# Patient Record
Sex: FEMALE | Race: WHITE
[De-identification: names, ages, dates, MRNs, and addresses within clinical notes are randomized per-mention and may not be internally consistent; named-entity substitution may affect disease eponyms.]

---

## 2017-04-17 ENCOUNTER — HOSPITAL ENCOUNTER (EMERGENCY)
Dept: HOSPITAL 74 - FER | Age: 42
Discharge: HOME | End: 2017-04-17
Payer: COMMERCIAL

## 2017-04-17 VITALS — SYSTOLIC BLOOD PRESSURE: 138 MMHG | TEMPERATURE: 98 F | DIASTOLIC BLOOD PRESSURE: 91 MMHG

## 2017-04-17 VITALS — BODY MASS INDEX: 29.9 KG/M2

## 2017-04-17 VITALS — HEART RATE: 98 BPM

## 2017-04-17 DIAGNOSIS — B97.89: ICD-10-CM

## 2017-04-17 DIAGNOSIS — J06.9: Primary | ICD-10-CM

## 2017-04-17 DIAGNOSIS — E07.9: ICD-10-CM

## 2017-04-17 DIAGNOSIS — F41.9: ICD-10-CM

## 2017-04-17 DIAGNOSIS — I10: ICD-10-CM

## 2017-04-17 DIAGNOSIS — R00.2: ICD-10-CM

## 2017-04-17 NOTE — PDOC
History of Present Illness





<Chantale Marcano - Last Filed: 04/17/17 21:20>





- General


History Source: Patient


Exam Limitations: No Limitations





- History of Present Illness


Initial Comments: 


04/17/17 21:15





A portion of this note was documented by scribe services under my direction. I 

have reviewed the details of the note, within reason, and agree with the 

documentation.  The case summary and management plan written by me. 








Assessment and plan: This is a 41-year-old female who has history of anxiety 

and palpitations in the past. Patient came in complaining of some upper 

respiratory tract symptoms and the palpitations. Patient had a heart rate 

initially of 113 in the emergency room but by the time she had a cardiogram 

done it was down to 106 the time of my evaluation it was done around 100.





Patient otherwise has a history of Hypertension and obesity but is otherwise 

healthy.  Patient cardiogram showed normal sinus rhythm at a rate of 106 no 

acute ST-T wave changes otherwise normal EKG





Patient given some Xanax for anxiety as she said she does feel anxious. Heart 

rate is 98 at discharge.





Patient discharged home will follow-up with her primary care doctor





04/17/17 21:27








<David Shepard - Last Filed: 04/17/17 21:28>





- General


Chief Complaint: Tachycardia


Stated Complaint: HEART RACING


Time Seen by Provider: 04/17/17 20:57





Past History





<Chantale Marcano - Last Filed: 04/17/17 21:20>





- Past Medical History


HTN: Yes


Thyroid Disease: Yes


Other medical history: ANXIETY





- Immunization History


Immunization Up to Date: Yes





- Psycho/Social/Smoking Cessation Hx


Anxiety: No


Suicidal Ideation: No


Smoking Status: No


Smoking History: Never smoked


Have you smoked in the past 12 months: No


Number of Cigarettes Smoked Daily: 0


Information on smoking cessation initiated: No


Hx Alcohol Use: No


Drug/Substance Use Hx: No


Substance Use Type: None





<David Shepard - Last Filed: 04/17/17 21:28>





- Past Medical History


Allergies/Adverse Reactions: 


 Allergies











Allergy/AdvReac Type Severity Reaction Status Date / Time


 


Penicillins Allergy Mild Hives Verified 07/27/16 18:52











Home Medications: 


Ambulatory Orders





Levothyroxine Sodium [Synthroid] 175 mcg PO DAILY 01/27/12 


Nebivolol [Bystolic -] 5 mg PO DAILY 05/24/16 


Escitalopram Oxalate [Lexapro -] 20 mg PO DAILY 04/17/17 











*Physical Exam





- Vital Signs


 Last Vital Signs











Temp Pulse Resp BP Pulse Ox


 


 98 F   113 H  16   138/91   98 


 


 04/17/17 20:52  04/17/17 20:52  04/17/17 20:52  04/17/17 20:52  04/17/17 20:52














<Chantale Marcano - Last Filed: 04/17/17 21:20>





- Vital Signs


 Last Vital Signs











Temp Pulse Resp BP Pulse Ox


 


 98 F   113 H  16   138/91   98 


 


 04/17/17 20:52  04/17/17 20:52  04/17/17 20:52  04/17/17 20:52  04/17/17 20:52














<David Shepard - Last Filed: 04/17/17 21:28>





**Heart Score/ECG Review


  ** #1


ECG reviewed & interpreted by me at: 21:03 (Vent Rate: 106 bpm. Sinus 

tachycardia. )





<Chantale Marcano - Last Filed: 04/17/17 21:20>





*DC/Admit/Observation/Transfer





- Attestations


Scribe Attestion: 


04/17/17 21:20





Documentation prepared by Chantale Marcano, acting as medical scribe for 

David Shepard MD. 





<Chantale Marcano - Last Filed: 04/17/17 21:20>





- Discharge Dispostion


Admit: No





<David Shepard - Last Filed: 04/17/17 21:28>


Diagnosis at time of Disposition: 


 Heart palpitations, Anxiety, Viral upper respiratory infection





- Discharge Dispostion


Disposition: HOME


Condition at time of disposition: Stable





- Patient Instructions


Additional Instructions: 


You can continue to take your medications as prescribed.





Return to the emergency department immediately with ANY new, persistent or 

worsening symptoms.





Continue any medications as previously prescribed by your physician.





You should follow up with your primary doctor as soon as possible regarding 

today's emergency department visit.


.


Please make sure your doctor reviews the results of your emergency evaluation.





Thank you for coming to the   Emergency Department today for your care. It was 

a pleasure to see you today. Please note that your evaluation is INCOMPLETE 

until you  follow-up with your doctor.

## 2017-04-17 NOTE — PDOC
*Physical Exam





- Vital Signs


 Last Vital Signs











Temp Pulse Resp BP Pulse Ox


 


 98 F   98 H  16   138/91   98 


 


 04/17/17 20:52  04/17/17 21:28  04/17/17 20:52  04/17/17 20:52  04/17/17 20:52














<David Shepard I - Last Filed: 04/17/17 21:29>





- Vital Signs


 Last Vital Signs











Temp Pulse Resp BP Pulse Ox


 


 98 F   98 H  16   138/91   98 


 


 04/17/17 20:52  04/17/17 21:28  04/17/17 20:52  04/17/17 20:52  04/17/17 20:52














<Chantale Marcano - Last Filed: 04/17/17 21:30>





ED Treatment Course





- Medications


Given in the ED: 


ED Medications














Discontinued Medications














Generic Name Dose Route Start Last Admin





  Trade Name Freq  PRN Reason Stop Dose Admin


 


Alprazolam  0.5 mg 04/17/17 21:18 04/17/17 21:25





  Xanax -  PO 04/17/17 21:19  0.5 mg





  ONCE STA   Administration














<David Shepard I - Last Filed: 04/17/17 21:29>





- Medications


Given in the ED: 


ED Medications














Discontinued Medications














Generic Name Dose Route Start Last Admin





  Trade Name Freq  PRN Reason Stop Dose Admin


 


Alprazolam  0.5 mg 04/17/17 21:18 04/17/17 21:25





  Xanax -  PO 04/17/17 21:19  0.5 mg





  ONCE STA   Administration














<Chantale Marcano - Last Filed: 04/17/17 21:30>





Progress Note





- Progress Note


Progress Note: 


[]This chart is to document the scribes portion of the chart


Which is the history of present illness the review of systems and the exam. The 

other chart was closed prior to scribes documentation.





<David Shepard - Last Filed: 04/17/17 21:29>





- Progress Note


Progress Note: 


The patient is a 41 year old female, with a significant past medical history of 

hypertension, hypothyroidism and anxiety, who presents to the emergency 

department with palpitations since earlier this evening. The patient states 

that she has experienced similar episodes of palpitations in the past. The 

patient additionally reports that she believes she is coming down with a cold, 

she reports URI symptoms including nasal congestion and a cough so she decided 

to visit the ED for further evaluation. Currently in the ED, the patient 

reports feeling anxious. The patient denies chest pain or shortness of breath. 

The patient denies fever, chills, nausea, vomiting or diarrhea. 





PAST MEDICAL HISTORY: See HPI.





PAST SURGICAL HISTORY: No significant history.





FAMILY HISTORY: No pertinent history.





SOCIAL HISTORY: Patient lives with family and is employed.





MEDICATIONS: Reviewed.





ALLERGIES: As per nursing notes.





Adult ROS:


General: No fevers or chills, no weakness, no weight loss.


HEENT: +Nasal congestion. No change in vision. No sore throat. No ear pain.


Cardiovascular: +Palpitations. No chest pain or shortness of breath.


Respiratory: +Cough. No wheezing. 


Gastrointestinal: No nausea, vomiting, diarrhea or constipation, no rectal 

bleeding.


Genitourinary: No dysuria, hematuria, or frequency.


Musculoskeletal: No joint or muscle pain or swelling.


Neurologic: No headache, vertigo, dizziness or loss of consciousness.


Psychiatric: +Anxiety. No depression.


Skin: No rashes or easy bruising.


Endocrine: No increased thirst or abnormal weight change.


Allergic: No skin or latex allergy.


All other systems reviewed and normal.





Adult Physical Exam:


General: Well-nourished well-developed individual, no acute distress.


HEENT: Nasal congestion. Throat: Normal, tonsils normal, no erythema or exudate.


Neck: Supple, no meningeal signs, no lymphadenopathy.


Eyes: Pupils equal reactive and round, extraocular motion intact.


Chest: Nontender to palpation. 


Cardiac: Mild tachycardia. S1-S2 normal, no murmurs rubs or gallops.


Respiratory: Lungs clear to auscultation bilateral.


Abdomen: Soft, nondistended, normal bowel sounds, nontender to palpation 

diffusely.


Extremities: Warm, dry, no cyanosis, clubbing, or edema.


Skin: No rashes.


Neuro: Alert and oriented x3, nonfocal exam, grossly intact, normal gait.


Psych: Normal mood and affect. 





<Chantale Marcano - Last Filed: 04/17/17 21:30>





*DC/Admit/Observation/Transfer





<David Shepard - Last Filed: 04/17/17 21:29>





- Attestations


Scribe Attestion: 


04/17/17 21:30





Documentation prepared by Chantale Marcano, acting as medical scribe for 

David Shepard MD. 





<Chantale Marcano - Last Filed: 04/17/17 21:30>


Diagnosis at time of Disposition: 


 Heart palpitations, Anxiety, Viral upper respiratory infection





- Discharge Dispostion


Disposition: HOME


Condition at time of disposition: Stable





- Patient Instructions


Additional Instructions: 


You can continue to take your medications as prescribed.





Return to the emergency department immediately with ANY new, persistent or 

worsening symptoms.





Continue any medications as previously prescribed by your physician.





You should follow up with your primary doctor as soon as possible regarding 

today's emergency department visit.


.


Please make sure your doctor reviews the results of your emergency evaluation.





Thank you for coming to the   Emergency Department today for your care. It was 

a pleasure to see you today. Please note that your evaluation is INCOMPLETE 

until you  follow-up with your doctor.

## 2017-04-18 NOTE — EKG
Test Reason : 

Blood Pressure : ***/*** mmHG

Vent. Rate : 106 BPM     Atrial Rate : 106 BPM

   P-R Int : 138 ms          QRS Dur : 082 ms

    QT Int : 356 ms       P-R-T Axes : 041 047 002 degrees

   QTc Int : 472 ms

 

SINUS TACHYCARDIA

OTHERWISE NORMAL ECG

NO PREVIOUS ECGS AVAILABLE

Confirmed by KEVIN NI, ERICH ([id number]) on 4/18/2017 1:16:05 PM

 

Referred By:  POLY           Confirmed By:ERICH BROWN MD

## 2017-09-25 ENCOUNTER — HOSPITAL ENCOUNTER (EMERGENCY)
Dept: HOSPITAL 74 - FER | Age: 42
Discharge: HOME | End: 2017-09-25
Payer: COMMERCIAL

## 2017-09-25 VITALS — BODY MASS INDEX: 51.2 KG/M2

## 2017-09-25 VITALS — HEART RATE: 82 BPM | DIASTOLIC BLOOD PRESSURE: 75 MMHG | SYSTOLIC BLOOD PRESSURE: 113 MMHG

## 2017-09-25 VITALS — TEMPERATURE: 98.6 F

## 2017-09-25 DIAGNOSIS — I10: ICD-10-CM

## 2017-09-25 DIAGNOSIS — R07.89: Primary | ICD-10-CM

## 2017-09-25 DIAGNOSIS — F41.9: ICD-10-CM

## 2017-09-25 DIAGNOSIS — E28.2: ICD-10-CM

## 2017-09-25 LAB
ALBUMIN SERPL-MCNC: 4.4 G/DL (ref 3.5–5)
ALP SERPL-CCNC: 46 U/L (ref 32–92)
ALT SERPL-CCNC: 24 U/L (ref 10–40)
ANION GAP SERPL CALC-SCNC: 10 MMOL/L (ref 8–16)
AST SERPL-CCNC: 23 U/L (ref 10–42)
BASOPHILS # BLD: 2 % (ref 0–2)
BILIRUB SERPL-MCNC: 0.8 MG/DL (ref 0.2–1)
CALCIUM SERPL-MCNC: 9.3 MG/DL (ref 8.4–10.2)
CK SERPL-CCNC: 178 IU/L (ref 26–192)
CO2 SERPL-SCNC: 23 MMOL/L (ref 22–28)
CREAT SERPL-MCNC: 0.7 MG/DL (ref 0.6–1.3)
DEPRECATED RDW RBC AUTO: 11.2 % (ref 11.6–15.6)
EOSINOPHIL # BLD: 2.4 % (ref 0–4.5)
GLUCOSE SERPL-MCNC: 90 MG/DL (ref 74–106)
MCH RBC QN AUTO: 30.4 PG (ref 25.7–33.7)
MCHC RBC AUTO-ENTMCNC: 34.5 G/DL (ref 32–36)
MCV RBC: 88 FL (ref 80–96)
NEUTROPHILS # BLD: 50.1 % (ref 42.8–82.8)
PLATELET # BLD AUTO: 332 K/MM3 (ref 134–434)
PMV BLD: 7.2 FL (ref 7.5–11.1)
PROT SERPL-MCNC: 7.6 G/DL (ref 6.4–8.3)
TROPONIN I SERPL-MCNC: < 0.03 NG/ML (ref 0.03–0.5)
WBC # BLD AUTO: 7.3 K/MM3 (ref 4–10.8)
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
## 2017-09-26 NOTE — EKG
Test Reason : 

Blood Pressure : ***/*** mmHG

Vent. Rate : 092 BPM     Atrial Rate : 092 BPM

   P-R Int : 144 ms          QRS Dur : 082 ms

    QT Int : 374 ms       P-R-T Axes : 075 014 006 degrees

   QTc Int : 462 ms

 

NORMAL SINUS RHYTHM

MODERATE VOLTAGE CRITERIA FOR LVH, MAY BE NORMAL VARIANT

BORDERLINE ECG

WHEN COMPARED WITH ECG OF 17-APR-2017 21:03,

NO SIGNIFICANT CHANGE WAS FOUND

REPEAT EKG IF CLINICALLY INDICATED

BASELINE ARTIFACT

Confirmed by WILMA FARMER MD ([id number]) on 9/26/2017 8:37:24 PM

 

Referred By:  AMANDA           Confirmed By:WILMA FARMER MD

## 2019-03-14 ENCOUNTER — HOSPITAL ENCOUNTER (EMERGENCY)
Dept: HOSPITAL 74 - JER | Age: 44
Discharge: HOME | End: 2019-03-14
Payer: COMMERCIAL

## 2019-03-14 VITALS — HEART RATE: 104 BPM | TEMPERATURE: 98.4 F | DIASTOLIC BLOOD PRESSURE: 89 MMHG | SYSTOLIC BLOOD PRESSURE: 162 MMHG

## 2019-03-14 VITALS — BODY MASS INDEX: 47.5 KG/M2

## 2019-03-14 DIAGNOSIS — K21.9: ICD-10-CM

## 2019-03-14 DIAGNOSIS — R07.89: Primary | ICD-10-CM

## 2019-03-14 DIAGNOSIS — E03.9: ICD-10-CM

## 2019-03-14 DIAGNOSIS — F41.9: ICD-10-CM

## 2019-03-14 LAB
ALBUMIN SERPL-MCNC: 3.7 G/DL (ref 3.4–5)
ALP SERPL-CCNC: 53 U/L (ref 45–117)
ALT SERPL-CCNC: 22 U/L (ref 13–61)
ANION GAP SERPL CALC-SCNC: 5 MMOL/L (ref 8–16)
AST SERPL-CCNC: 26 U/L (ref 15–37)
BASOPHILS # BLD: 0.3 % (ref 0–2)
BILIRUB SERPL-MCNC: 0.4 MG/DL (ref 0.2–1)
BUN SERPL-MCNC: 9 MG/DL (ref 7–18)
CALCIUM SERPL-MCNC: 9.1 MG/DL (ref 8.5–10.1)
CHLORIDE SERPL-SCNC: 107 MMOL/L (ref 98–107)
CO2 SERPL-SCNC: 26 MMOL/L (ref 21–32)
CREAT SERPL-MCNC: 0.6 MG/DL (ref 0.55–1.3)
DEPRECATED RDW RBC AUTO: 12.8 % (ref 11.6–15.6)
EOSINOPHIL # BLD: 2.3 % (ref 0–4.5)
GLUCOSE SERPL-MCNC: 104 MG/DL (ref 74–106)
HCT VFR BLD CALC: 37.4 % (ref 32.4–45.2)
HGB BLD-MCNC: 13.1 GM/DL (ref 10.7–15.3)
INR BLD: 0.97 (ref 0.83–1.09)
LYMPHOCYTES # BLD: 35.7 % (ref 8–40)
MCH RBC QN AUTO: 31.4 PG (ref 25.7–33.7)
MCHC RBC AUTO-ENTMCNC: 34.9 G/DL (ref 32–36)
MCV RBC: 89.9 FL (ref 80–96)
MONOCYTES # BLD AUTO: 8.5 % (ref 3.8–10.2)
NEUTROPHILS # BLD: 53.2 % (ref 42.8–82.8)
PLATELET # BLD AUTO: 325 K/MM3 (ref 134–434)
PMV BLD: 7.6 FL (ref 7.5–11.1)
POTASSIUM SERPLBLD-SCNC: 4.2 MMOL/L (ref 3.5–5.1)
PROT SERPL-MCNC: 7.3 G/DL (ref 6.4–8.2)
PT PNL PPP: 11.5 SEC (ref 9.7–13)
RBC # BLD AUTO: 4.17 M/MM3 (ref 3.6–5.2)
SODIUM SERPL-SCNC: 137 MMOL/L (ref 136–145)
WBC # BLD AUTO: 8.2 K/MM3 (ref 4–10)

## 2019-03-14 NOTE — PDOC
History of Present Illness





- General


Chief Complaint: Chest Pain


Stated Complaint: CHEST PAIN


Time Seen by Provider: 03/14/19 20:44


History Source: Patient


Exam Limitations: No Limitations





Past History





- Past Medical History


Allergies/Adverse Reactions: 


 Allergies











Allergy/AdvReac Type Severity Reaction Status Date / Time


 


Penicillins Allergy   Verified 03/14/19 20:47











Home Medications: 


Ambulatory Orders





NK [No Known Home Medication]  03/14/19 








COPD: No


Thyroid Disease: Yes





- Suicide/Smoking/Psychosocial Hx


Smoking History: Never smoked





*Physical Exam





- Vital Signs


 Last Vital Signs











Temp Pulse Resp BP Pulse Ox


 


 98.4 F   104 H  20   162/89   98 


 


 03/14/19 20:44  03/14/19 20:44  03/14/19 20:44  03/14/19 20:44  03/14/19 20:44














- Physical Exam


General Appearance: No: Apparent Distress


Respiratory/Chest: positive: Lungs Clear, Normal Breath Sounds.  negative: 

Chest Tender, Respiratory Distress


Cardiovascular: positive: Regular Rhythm, Regular Rate, S1, S2.  negative: 

Murmur


Gastrointestinal/Abdominal: positive: Normal Bowel Sounds, Soft.  negative: 

Tender, Distended, Guarding, Rebound


Extremity: positive: Normal Inspection.  negative: Pedal Edema, Calf Tenderness


Integumentary: positive: Normal Color


Neurologic: positive: Fully Oriented, Alert, Normal Mood/Affect





**Heart Score/ECG Review





- History


History: Slightly suspicious





- Electrocardiogram


EKG: Normal





- Age


Age: 45-65





- Risk Factors


Risk Factors Heart Score: Yes Positive family hx of cardiac disease


Based on the list above the patient has:: 1-2 risk factors





- Troponin


Troponin: </= normal limit





- Score


Heart Score - Total: 2





Moderate Sedation





- Procedure Monitoring


Vital Signs: 


Procedure Monitoring Vital Signs











Temperature  98.4 F   03/14/19 20:44


 


Pulse Rate  104 H  03/14/19 20:44


 


Respiratory Rate  20   03/14/19 20:44


 


Blood Pressure  162/89   03/14/19 20:44


 


O2 Sat by Pulse Oximetry (%)  98   03/14/19 20:44











ED Treatment Course





- LABORATORY


CBC & Chemistry Diagram: 


 03/14/19 21:03





 03/14/19 21:03





- ADDITIONAL ORDERS


Additional order review: 


 Laboratory  Results











  03/14/19 03/14/19





  21:03 21:03


 


PT with INR  11.50 


 


INR  0.97 


 


Sodium   137


 


Potassium   4.2


 


Chloride   107


 


Carbon Dioxide   26


 


Anion Gap   5 L


 


BUN   9


 


Creatinine   0.6


 


Creat Clearance w eGFR   108.11


 


Random Glucose   104


 


Calcium   9.1


 


Total Bilirubin   0.4


 


AST   26


 


ALT   22


 


Alkaline Phosphatase   53


 


Creatine Kinase   161


 


Creatine Kinase Index   0.6


 


CK-MB (CK-2)   < 1.0


 


Troponin I   < 0.02


 


Total Protein   7.3


 


Albumin   3.7








 











  03/14/19





  21:03


 


RBC  4.17


 


MCV  89.9


 


MCHC  34.9


 


RDW  12.8


 


MPV  7.6


 


Neutrophils %  53.2


 


Lymphocytes %  35.7


 


Monocytes %  8.5


 


Eosinophils %  2.3


 


Basophils %  0.3














Medical Decision Making





- Medical Decision Making








42 y/o F with hx of GERD, anxiety, hypothyroidism presents with sharp pain 

under R breast and burning substernal CP which initially started 2 days ago at 

night and then reoccurred again today around 4:30 PM. Mentions was very 

stressed at work today so unsure if that triggered it. Does not feel like a 

panic attack. Tried taking Tums but they did not help either. CP is 

nonexertional and not worsened by anything in particular. Currently is pain 

free. Denies fever, URI sxs, cough, sob, palpitations, dizziness, abd pain, n/v

, calf pain, calf swelling, recent travel/surgeries, OCP use. Denies having 

this type of pain before. Mentions her mom had "mini-MI" around age 65.





Consider ACS


No risk factors for PE


EKG: NSR at 98 bpm, no ST-T changes


Plan: Labs, CXR





03/14/19 21:30





Labs reviewed and unremarkable


Heart score 2


Patient resting comfortably, in no pain


Patient stable for d/c; advised f/u with her PCP





03/14/19 22:49








*DC/Admit/Observation/Transfer


Diagnosis at time of Disposition: 


Chest pain


Qualifiers:


 Chest pain type: other chest pain Qualified Code(s): R07.89 - Other chest pain








- Discharge Dispostion


Disposition: HOME


Condition at time of disposition: Stable


Decision to Admit order: No





- Referrals


Referrals: 


Jan Pandya MD [Primary Care Provider] - 2 Days





- Patient Instructions


Printed Discharge Instructions:  DI for Chest Pain


Additional Instructions: 





Thank you for choosing [facility].  It was a pleasure taking 

care of you.  





Your labs and chest xray were normal


Would recommend you follow-up with your doctor for further evaluation 





Return to the Emergency Department if your symptoms worsen or persist or have 

other concerning symptoms. 





- Post Discharge Activity

## 2019-03-14 NOTE — PDOC
Rapid Medical Evaluation


Time Seen by Provider: 03/14/19 20:44


Medical Evaluation: 





03/14/19 20:46


I performed a brief in-person evaluation of this patient.


Chief complaint: Sharp chest pain, "more intense" than previous pain with GERD 

or panic attacks


Pertinent physical exam findings: RRR, S1/S2, mildly tachycardic. Clear lungs. 


I have ordered the following: EKG, CXR, cardiac labs, UA, urine preg





Patient will proceed to the ED for further evaluation.





**Discharge Disposition





- Diagnosis


 Chest pain








- Referrals





- Patient Instructions





- Post Discharge Activity

## 2019-03-15 NOTE — EKG
Test Reason : 

Blood Pressure : ***/*** mmHG

Vent. Rate : 098 BPM     Atrial Rate : 098 BPM

   P-R Int : 142 ms          QRS Dur : 086 ms

    QT Int : 370 ms       P-R-T Axes : 043 030 019 degrees

   QTc Int : 472 ms

 

NORMAL SINUS RHYTHM

POOR R WAVE PROGRESSION

ABNORMAL ECG

NO PREVIOUS ECGS AVAILABLE

Confirmed by BARON CORONEL MD ([id number]) on 3/15/2019 10:57:18 AM

 

Referred By:             Confirmed By:BARON CORONEL MD

## 2019-05-16 ENCOUNTER — HOSPITAL ENCOUNTER (EMERGENCY)
Dept: HOSPITAL 74 - FER | Age: 44
LOS: 1 days | Discharge: HOME | End: 2019-05-17
Payer: COMMERCIAL

## 2019-05-16 VITALS — SYSTOLIC BLOOD PRESSURE: 150 MMHG | TEMPERATURE: 98.1 F | HEART RATE: 106 BPM | DIASTOLIC BLOOD PRESSURE: 96 MMHG

## 2019-05-16 VITALS — BODY MASS INDEX: 49.4 KG/M2

## 2019-05-16 DIAGNOSIS — E03.9: ICD-10-CM

## 2019-05-16 DIAGNOSIS — S83.91XA: Primary | ICD-10-CM

## 2019-05-16 DIAGNOSIS — F41.9: ICD-10-CM

## 2019-05-16 PROCEDURE — 3E0233Z INTRODUCTION OF ANTI-INFLAMMATORY INTO MUSCLE, PERCUTANEOUS APPROACH: ICD-10-PCS

## 2019-05-16 PROCEDURE — 2W3QX1Z IMMOBILIZATION OF RIGHT LOWER LEG USING SPLINT: ICD-10-PCS

## 2019-05-16 NOTE — PDOC
History of Present Illness





- General


Chief Complaint: Pain, Acute


Stated Complaint: RT KNEE PAIN


Time Seen by Provider: 05/16/19 23:37





- History of Present Illness


Initial Comments: 





This 43-year-old woman with a history of hypothyroidism/anxiety presents with 1 

day history of worsening right knee pain.  Patient states that she has had 

right knee pain for several weeks after minor injury.  She was seen by her PMD, 

 and MRI of the right knee was ordered(awaiting insurance approval)

.  Today, patient states that she twisted her right knee and since then has had 

pain with weightbearing.  She did not fall and denies any impact of the knee or 

other body part.  She took OTC naproxen for the knee pain about 4 hours prior 

to presentation with minimal relief.


She denies any other pain or edema of the right leg.

















Past History





- Past Medical History


Allergies/Adverse Reactions: 


 Allergies











Allergy/AdvReac Type Severity Reaction Status Date / Time


 


Penicillins Allergy   Verified 03/14/19 20:47











Home Medications: 


Ambulatory Orders





Levothyroxine [Synthroid -] 125 mcg PO DAILY 05/16/19 


Naproxen Sodium [Aleve] 440 mg PO ONCE 05/16/19 


Diclofenac Sodium [Voltaren -] 75 mg PO BID PRN #20 tablet. 05/17/19 








COPD: No


Thyroid Disease: Yes





- Suicide/Smoking/Psychosocial Hx


Smoking History: Never smoked





**Review of Systems





- Review of Systems


Able to Perform ROS?: Yes


Comments:: 





12 point review of systems is negative except for what is noted in the history 

of present illness








*Physical Exam





- Vital Signs


 Last Vital Signs











Temp Pulse Resp BP Pulse Ox


 


 98.1 F   106 H  16   150/96   98 


 


 05/16/19 23:37  05/16/19 23:37  05/16/19 23:37  05/16/19 23:37  05/16/19 23:37














- Physical Exam


Comments: 





GENERAL: Adult female, alert and oriented 3, in mild distress secondary to 

right knee pain


HEAD: Normal with no signs of trauma.


EXTREMITIES: Right knee-minimal edema, moderate tenderness especially medial 

aspect and with valgus stressing


                                                No ecchymosis or deformity noted


                                               Pain is reproduced with passive 

and active extension of the joint


                                               No pain or deformity of the 

patella


                                Extremity exam is otherwise normal


NEUROLOGICAL: Cranial nerves II through XII grossly intact.  Normal speech.  No 

focal 


neurological deficits. 


MUSCULOSKELETAL:  Back non-tender to palpation, no CVA tenderness


SKIN: Warm, Dry, normal turgor, no rashes or lesions noted.








Progress Note





- Progress Note


Progress Note: 





This 43-year-old year-old woman with a history of right knee pain in the recent 

past presents with increased pain this evening after twisting the right knee 

during the day today.  There was no fall or other impact of the knee.  Exam as 

noted.


Because she did not fall or otherwise impact the knee and without evidence of 

deformity/ecchymosis/marked edema, fracture dislocation is highly unlikely.  

Therefore, plain x-ray was not performed.


Clinical presentation most consistent with soft tissue injury such as 

ligamentous strain, possibly medially from findings on the exam.  MRI of the 

knee has already been ordered by the patient's PMD and awaiting insurance 

approval.  Therefore, the patient was given Toradol 60 mg IM now for analgesia/

anti-inflammatory effects.





Knee immobilizer was placed on the right knee; patient should wear this during 

the day until reevaluated by her PMD.


Diclofenac 75 mg to be used up to twice a day as needed for pain prescribed


She should inform her doctor of her new injury and visit to the ER.


She return to the emergency room if she has severe, persistent pain or develops 

increased swelling/bruising.





*DC/Admit/Observation/Transfer


Diagnosis at time of Disposition: 


Knee sprain


Qualifiers:


 Encounter type: initial encounter Involved ligament of knee: unspecified 

ligament Laterality: right Qualified Code(s): S83.91XA - Sprain of unspecified 

site of right knee, initial encounter








- Discharge Dispostion


Disposition: HOME


Condition at time of disposition: Stable





- Prescriptions


Prescriptions: 


Diclofenac Sodium [Voltaren -] 75 mg PO BID PRN #20 tablet.dr


 PRN Reason: Pain





- Referrals


Referrals: 


Jan Pandya MD [Primary Care Provider] - 





- Patient Instructions


Printed Discharge Instructions:  Knee Sprain


Additional Instructions: 


Ice/elevation of right knee as much as possible over the next 2 days


Avoid walking/standing for prolonged periods


Knee immobilizer when up and around until MRI is performed


Diclofenac 75 mg twice a day with food as needed(stop Aleve)


Follow-up with your general medical doctor as scheduled


Return to ER if you have severe pain/swelling





- Post Discharge Activity

## 2020-02-10 ENCOUNTER — HOSPITAL ENCOUNTER (EMERGENCY)
Dept: HOSPITAL 74 - FER | Age: 45
Discharge: HOME | End: 2020-02-10
Payer: COMMERCIAL

## 2020-02-10 VITALS — TEMPERATURE: 98.1 F | HEART RATE: 100 BPM

## 2020-02-10 VITALS — DIASTOLIC BLOOD PRESSURE: 100 MMHG | SYSTOLIC BLOOD PRESSURE: 158 MMHG

## 2020-02-10 VITALS — BODY MASS INDEX: 48.4 KG/M2

## 2020-02-10 DIAGNOSIS — Z88.0: ICD-10-CM

## 2020-02-10 DIAGNOSIS — K08.89: Primary | ICD-10-CM

## 2020-02-10 DIAGNOSIS — F41.0: ICD-10-CM

## 2020-02-10 DIAGNOSIS — E07.9: ICD-10-CM

## 2020-02-10 DIAGNOSIS — E03.9: ICD-10-CM

## 2020-02-10 DIAGNOSIS — I10: ICD-10-CM

## 2020-02-10 DIAGNOSIS — E28.2: ICD-10-CM

## 2020-02-10 NOTE — PDOC
History of Present Illness





- General


Chief Complaint: Toothache


Stated Complaint: TOOTHACHE


Time Seen by Provider: 02/10/20 11:17


History Source: Patient





- History of Present Illness


Initial Comments: 





02/10/20 11:41


Ms. Marshall is a 45 y/o woman w/hx HTN, hypothyroidism, dental filling p/w 

three days of worsening dental pain. She reports that the pain began friday but 

became more severe starting saturday. She reports the pain as throbbing in the 

R molar, 8/10 at rest and 10/10 with hot or cold liquids, chewing. She reports 

taking acetaminophen and ibuprofen for the pain for the last few days, which 

alleviated the pain initially but has been less effective the last two days. 

She denies any shortness of breath, chest pain, facial numbness or swelling, 

difficulty speaking, difficulty swallowing, fevers, chills, nausea, vomiting, 

or prior maxillofacial surgery. She has an appointment with her dentist on 

Wednesday. 














Past History





- Past Medical History


Allergies/Adverse Reactions: 


 Allergies











Allergy/AdvReac Type Severity Reaction Status Date / Time


 


Penicillins Allergy Mild Hives Verified 02/10/20 11:21











Home Medications: 


Ambulatory Orders





Acetaminophen [Tylenol] 650 mg PO TID PRN 02/10/20 


Clindamycin [Cleocin -] 150 mg PO Q6H 5 Days #20 capsule 02/10/20 


Ibuprofen 800 mg PO Q8H PRN #15 tablet 02/10/20 


Ibuprofen [Advil -] 200 mg PO TID PRN 02/10/20 


Levothyroxine [Synthroid -] 125 mcg PO DAILY 02/10/20 








 Disorders: Yes (PCOS)


HTN: Yes


Psychiatric Problems: Yes (ANXIETY/PANIC ATTACKS)


Thyroid Disease: Yes





- Immunization History


Immunization Up to Date: Yes





- Psycho Social/Smoking Cessation Hx


Smoking Status: No


Smoking History: Never smoked


Have you smoked in the past 12 months: No


Number of Cigarettes Smoked Daily: 0


Hx Alcohol Use: No


Drug/Substance Use Hx: No


Substance Use Type: None





**Review of Systems





- Review of Systems


Able to Perform ROS?: Yes


Comments:: 





02/10/20 11:54


ROS: 


GENERAL/CONSTITUTIONAL: No fever or chills. No weakness.


HEAD, EYES, EARS, NOSE AND THROAT: R molar pain. No change in vision. No ear 

pain or discharge. No sore throat.


CARDIOVASCULAR: No chest pain or shortness of breath


RESPIRATORY: No cough, wheezing, or hemoptysis.


GASTROINTESTINAL: No nausea, vomiting, diarrhea or constipation.


GENITOURINARY: No dysuria, frequency, or change in urination.


MUSCULOSKELETAL: No joint or muscle swelling or pain. No neck or back pain.


SKIN: No rash


NEUROLOGIC: No headache, vertigo, loss of consciousness, or change in strength/

sensation.


ENDOCRINE: No increased thirst. No abnormal weight change


HEMATOLOGIC/LYMPHATIC: No anemia, easy bleeding, or history of blood clots.


ALLERGIC/IMMUNOLOGIC: No hives or skin allergy.











*Physical Exam





- Physical Exam





02/10/20 11:55


PE: 


GENERAL: Awake, alert, and fully oriented, in no acute distress


HEAD: No signs of trauma, normocephalic, atraumatic. No facial swelling noted. 

Tenderness on palpation of R 31st tooth, no no surrounding erythema, lesions, 

or abscess noted. No tenderness to external palpation of mandible. 


EYES: PERRLA, EOMI, sclera anicteric, conjunctiva clear


ENT: Auricles normal inspection, hearing grossly normal, nares patent, 

oropharynx clear without exudates. Moist mucosa


NECK: Normal ROM, supple, no lymphadenopathy, JVD, or masses


LUNGS: No distress, speaks full sentences, clear to auscultation bilaterally 


HEART: Regular rate and rhythm, normal S1 and S2, no murmurs, rubs or gallops, 

peripheral pulses normal and equal bilaterally. 


ABDOMEN: Soft, nontender, normoactive bowel sounds.  No guarding, no rebound.  

No masses


EXTREMITIES : Normal inspection, Normal range of motion, no edema.  No clubbing 

or cyanosis


NEUROLOGICAL: Cranial nerves II through XII grossly intact.  Normal speech, 

normal gait, no focal sensorimotor deficits 


SKIN: Warm, Dry, normal turgor, no rashes or lesions noted











Medical Decision Making





- Medical Decision Making





02/10/20 11:47


44F w/hx HTN, hypothyroidism, tooth filling p/w tooth pain for 3 days, with no 

dysphagia, fevers, chills, or difficulty breathing, likely representing 

pulpitis. Ludwigs angina unlikely given lack of edema, dysphagia, necrotizing 

ulcerative gingivitis unlikely given benign exam. 





Plan: 


Clindamycin 150 mg 4x daily for 5 days


Ibuprofen 800 mg TID prn pain 5 days





Dispo: 


Discharge, follow up with dentistry











Discharge





- Discharge Information


Problems reviewed: Yes


Clinical Impression/Diagnosis: 


 Pain, dental





Condition: Good


Disposition: HOME





- Admission


No





- Additional Discharge Information


Prescriptions: 


Clindamycin [Cleocin -] 150 mg PO Q6H 5 Days #20 capsule


Ibuprofen 800 mg PO Q8H PRN #15 tablet


 PRN Reason: Pain





- Follow up/Referral


Referrals: 


Jan Pandya MD [Primary Care Provider] - 





- Patient Discharge Instructions


Patient Printed Discharge Instructions:  DI for Dental Pain


Additional Instructions: 


You were seen in the ER for dental pain. Your exam was normal. Please be sure 

to keep your appointment with your dentist. We are prescribing you an antibiotic

, clindamycin. Please take it as directed, every 6 hours for 5 days. We are 

also prescribing a prescription strength ibuprofen, please take it as needed 

for pain, no more than one pill every 8 hours. Please return to the ER if you 

develop high fevers, difficulty breathing, chest pain, weakness. 





- Post Discharge Activity

## 2020-02-10 NOTE — PDOC
Attending Attestation





- Resident


Resident Name: AncelmoJohn urbina





- ED Attending Attestation


I have performed the following: I have examined & evaluated the patient, The 

case was reviewed & discussed with the resident, I agree w/resident's findings 

& plan, Exceptions are as noted





- HPI


HPI: 





02/10/20 11:57


Toothache right lower molar since Friday.  Taking ibuprofen.  Called her 

dentist today, who could not see her, and was instructed to go to the emergency 

room.





No fever/chills or other constitutional signs.





- Physicial Exam


PE: 





02/10/20 11:58


Physical exam: Afebrile.  Vital signs normal except for borderline HBP, for 

which she is being treated


There is no swelling, erythema, or warmth of the face.  There is no swelling, 

erythema, or fluctuance visible or palpable in the oropharynx, although the 

last molar is mildly tender when palpated with a tongue blade.  The gingiva 

appears healthy.


No palpable cervical nodes








- Medical Decision Making





02/10/20 12:00


Assessment: Probable dental abscess with pain, no sign of cellulitis or 

significant facial infection





Plan: Antibiotics and analgesics.  Follow-up with dentist when scheduled on 

Wednesday.  It was made clear that antibiotics may help to control the 

infection temporarily, but that it will not resolve without treatment by 

dentist.  Patient seems to understand and agree to timely follow-up.  

Discharged in no severe pain or other distress to follow-up as directed

## 2023-07-29 ENCOUNTER — HOSPITAL ENCOUNTER (EMERGENCY)
Dept: HOSPITAL 74 - JER | Age: 48
Discharge: HOME | End: 2023-07-29
Payer: COMMERCIAL

## 2023-07-29 VITALS
RESPIRATION RATE: 18 BRPM | SYSTOLIC BLOOD PRESSURE: 129 MMHG | HEART RATE: 88 BPM | DIASTOLIC BLOOD PRESSURE: 71 MMHG | TEMPERATURE: 98.4 F

## 2023-07-29 VITALS — BODY MASS INDEX: 47.8 KG/M2

## 2023-07-29 DIAGNOSIS — R41.9: Primary | ICD-10-CM

## 2023-07-29 DIAGNOSIS — R10.9: ICD-10-CM

## 2023-07-29 LAB
ALBUMIN SERPL-MCNC: 3.8 G/DL (ref 3.4–5)
ALP SERPL-CCNC: 59 U/L (ref 45–117)
ALT SERPL-CCNC: 21 U/L (ref 13–61)
ANION GAP SERPL CALC-SCNC: 7 MMOL/L (ref 8–16)
AST SERPL-CCNC: 15 U/L (ref 15–37)
BASOPHILS # BLD: 0.5 % (ref 0–2)
BILIRUB SERPL-MCNC: 0.2 MG/DL (ref 0.2–1)
BUN SERPL-MCNC: 11.9 MG/DL (ref 7–18)
CALCIUM SERPL-MCNC: 8.8 MG/DL (ref 8.5–10.1)
CHLORIDE SERPL-SCNC: 108 MMOL/L (ref 98–107)
CO2 SERPL-SCNC: 26 MMOL/L (ref 21–32)
CREAT SERPL-MCNC: 0.9 MG/DL (ref 0.55–1.3)
DEPRECATED RDW RBC AUTO: 12.6 % (ref 11.6–15.6)
EOSINOPHIL # BLD: 2.4 % (ref 0–4.5)
GLUCOSE SERPL-MCNC: 128 MG/DL (ref 74–106)
HCT VFR BLD CALC: 37.5 % (ref 32.4–45.2)
HGB BLD-MCNC: 12.8 GM/DL (ref 10.7–15.3)
LYMPHOCYTES # BLD: 31.3 % (ref 8–40)
MAGNESIUM SERPL-MCNC: 2 MG/DL (ref 1.8–2.4)
MCH RBC QN AUTO: 29.9 PG (ref 25.7–33.7)
MCHC RBC AUTO-ENTMCNC: 34 G/DL (ref 32–36)
MCV RBC: 87.9 FL (ref 80–96)
MONOCYTES # BLD AUTO: 9.4 % (ref 3.8–10.2)
NEUTROPHILS # BLD: 56.4 % (ref 42.8–82.8)
PLATELET # BLD AUTO: 348 10^3/UL (ref 134–434)
PMV BLD: 7.4 FL (ref 7.5–11.1)
POTASSIUM SERPLBLD-SCNC: 3.7 MMOL/L (ref 3.5–5.1)
PROT SERPL-MCNC: 7.6 G/DL (ref 6.4–8.2)
RBC # BLD AUTO: 4.27 M/MM3 (ref 3.6–5.2)
SODIUM SERPL-SCNC: 142 MMOL/L (ref 136–145)
WBC # BLD AUTO: 8.9 K/MM3 (ref 4–10)